# Patient Record
Sex: MALE | Race: WHITE | NOT HISPANIC OR LATINO | Employment: STUDENT | URBAN - METROPOLITAN AREA
[De-identification: names, ages, dates, MRNs, and addresses within clinical notes are randomized per-mention and may not be internally consistent; named-entity substitution may affect disease eponyms.]

---
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
## 2023-05-11 ENCOUNTER — APPOINTMENT (OUTPATIENT)
Dept: URGENT CARE | Age: 18
End: 2023-05-11

## 2024-01-24 ENCOUNTER — OFFICE VISIT (OUTPATIENT)
Dept: URGENT CARE | Facility: CLINIC | Age: 19
End: 2024-01-24
Payer: COMMERCIAL

## 2024-01-24 VITALS — TEMPERATURE: 98.4 F | OXYGEN SATURATION: 99 % | RESPIRATION RATE: 16 BRPM | HEART RATE: 100 BPM | WEIGHT: 150 LBS

## 2024-01-24 DIAGNOSIS — Z23 ENCOUNTER FOR ADMINISTRATION OF VACCINE: Primary | ICD-10-CM

## 2024-01-24 PROCEDURE — 90675 RABIES VACCINE IM: CPT

## 2024-01-24 PROCEDURE — G0382 LEV 3 HOSP TYPE B ED VISIT: HCPCS | Performed by: NURSE PRACTITIONER

## 2024-01-24 PROCEDURE — 90471 IMMUNIZATION ADMIN: CPT | Performed by: NURSE PRACTITIONER

## 2024-01-24 NOTE — PROGRESS NOTES
[facility]        NAME: Ronaldo Anton is a 18 y.o. male  : 2005    MRN: [id number]  DATE: 2024  TIME: 5:42 PM    Assessment and Plan   Encounter for administration of vaccine [Z23]  1. Encounter for administration of vaccine  Rabies vaccine IM (human diploid, IMOVAX)            Patient Instructions       Follow up with PCP in 3-5 days.  Proceed to  ER if symptoms worsen.    Chief Complaint     Chief Complaint   Patient presents with    Immunizations     Here for 3rd rabies vaccine           History of Present Illness       Patient is a 18 year old male presenting for 3rd rabies vaccine. He is a [employer] student who went on a volunteer trip from -. He reports there were bats in the room he was sleeping in. Denies any know contact. He had rabies Immuglobin and rabies vaccine 1 and 2 at [facility]. Denies concerns with prior vaccines.         Review of Systems   Review of Systems   Constitutional:  Negative for activity change, chills and fever.   Skin:  Negative for rash.   Neurological:  Negative for headaches.         Current Medications     No current outpatient medications on file.    Current Allergies     Allergies as of 2024    (Not on File)            The following portions of the patient's history were reviewed and updated as appropriate: allergies, current medications, past family history, past medical history, past social history, past surgical history and problem list.     No past medical history on file.    No past surgical history on file.    No family history on file.      Medications have been verified.        Objective   Pulse 100   Temp 98.4 °F (36.9 °C)   Resp 16   Wt 68 kg (150 lb)   SpO2 99%        Physical Exam     Physical Exam  Vitals reviewed.   Constitutional:       General: He is awake. He is not in acute distress.     Appearance: Normal appearance. He is normal weight.   Cardiovascular:      Rate and Rhythm: Normal rate.    Pulmonary:      Effort: Pulmonary effort is normal.   Skin:     General: Skin is warm and moist.   Neurological:      General: No focal deficit present.      Mental Status: He is alert and oriented to person, place, and time.   Psychiatric:         Behavior: Behavior is cooperative.

## 2024-01-24 NOTE — PATIENT INSTRUCTIONS
Rabies vaccine #3 given today.  Imovax Rabies   [employer]  Lot  [id number]  Exp. 10/2025    Please return on 1/31/2024 for your 4th and final rabies vaccine    Rabies   WHAT YOU NEED TO KNOW:   Rabies is a disease that affects the body's central nervous system (brain, spinal cord, and nerves). Rabies is caused by a virus. You may get the virus if you come into contact with the saliva or other tissue of an infected animal. Rabies infection usually happens through a bite wound. Animals that may spread rabies include dogs, cats, coyotes, raccoons, foxes, skunks, and bats. Rabies develops when the virus enters the skin and goes to the muscles or nerves.  DISCHARGE INSTRUCTIONS:   Call your local emergency number (911 in the [place]) or have someone else call if:   You have trouble swallowing or slurred speech.    You have double vision, or you see things that are not really there.    You begin twitching, have muscle cramps, or have a seizure.    Return to the emergency department if:   You think you were exposed to rabies.    You were bitten by an animal. Clean the wound as soon after the bite as possible.    You feel weak, tired, dizzy, confused, restless, or anxious.    Call your doctor if:   You have a fever.    Your signs and symptoms do not get better after treatment.    You have questions or concerns about rabies and rabies treatment.    Medicines:  You may need any of the following:  Medicines  such as the rabies vaccine or immune globulin may be given. These medicines help your body fight the virus and prevent rabies. Medicines may be given to help control seizures, treat a viral infection, or decrease inflammation.    Prescription pain medicine  may be given. Ask your healthcare provider how to take this medicine safely. Some prescription pain medicines contain acetaminophen. Do not take other medicines that contain acetaminophen without talking to your healthcare provider. Too much acetaminophen may cause liver  damage. Prescription pain medicine may cause constipation. Ask your healthcare provider how to prevent or treat constipation.     Take your medicine as directed.  Contact your healthcare provider if you think your medicine is not helping or if you have side effects. Tell your provider if you are allergic to any medicine. Keep a list of the medicines, vitamins, and herbs you take. Include the amounts, and when and why you take them. Bring the list or the pill bottles to follow-up visits. Carry your medicine list with you in case of an emergency.    If an animal that can carry rabies bites you:   Clean the bite wound.  Clean the bite wound for at least 5 minutes. Use soap and water, or povidone-iodine solution mixed with water. Do this right after you are bitten to lower the risks for a wound infection and rabies. Cover the wound with a clean bandage to prevent infection.    Seek care right away.  Healthcare providers may need to treat the wound and close it with stitches. You may need to take antibiotics to help fight or treat a bacterial infection. The rabies vaccine series may be started immediately.    Prevent rabies:   Ask your healthcare provider about the rabies vaccine.  You may need the vaccine if your risk for rabies is increased through work or travel. You will be given 2 doses a week apart. You may need a booster dose within 3 years after the first 2 doses.    Avoid contact with animals.  Do not approach any wild animal, or any tame animal that you do not know. Cover windows and other openings in your home with screens so wild animals cannot get inside.    Get medical care if you get bitten by an animal.  Do this even if the wound is very small.    Get your pet vaccinated against rabies.  You will need to do this every 3 years or as directed by your .       Follow up with your doctor as directed:  Write down your questions so you remember to ask them during your visits.  © Copyright [employer]  2023 Information is for End User's use only and may not be sold, redistributed or otherwise used for commercial purposes.  The above information is an  only. It is not intended as medical advice for individual conditions or treatments. Talk to your doctor, nurse or pharmacist before following any medical regimen to see if it is safe and effective for you.

## 2024-01-31 ENCOUNTER — OFFICE VISIT (OUTPATIENT)
Dept: URGENT CARE | Facility: CLINIC | Age: 19
End: 2024-01-31
Payer: COMMERCIAL

## 2024-01-31 VITALS — TEMPERATURE: 98.3 F

## 2024-01-31 DIAGNOSIS — Z23 NEED FOR RABIES VACCINATION: Primary | ICD-10-CM

## 2024-01-31 PROCEDURE — 90471 IMMUNIZATION ADMIN: CPT

## 2024-01-31 PROCEDURE — 90675 RABIES VACCINE IM: CPT

## 2024-01-31 RX ORDER — DAPSONE 75 MG/G
GEL TOPICAL
COMMUNITY
Start: 2023-11-20

## 2024-05-31 ENCOUNTER — OFFICE VISIT (OUTPATIENT)
Dept: URGENT CARE | Facility: CLINIC | Age: 19
End: 2024-05-31
Payer: COMMERCIAL

## 2024-05-31 VITALS
HEART RATE: 112 BPM | WEIGHT: 150 LBS | TEMPERATURE: 98.7 F | OXYGEN SATURATION: 98 % | BODY MASS INDEX: 21.47 KG/M2 | DIASTOLIC BLOOD PRESSURE: 84 MMHG | RESPIRATION RATE: 14 BRPM | SYSTOLIC BLOOD PRESSURE: 137 MMHG | HEIGHT: 70 IN

## 2024-05-31 DIAGNOSIS — J02.9 SORE THROAT: Primary | ICD-10-CM

## 2024-05-31 DIAGNOSIS — J06.9 VIRAL UPPER RESPIRATORY TRACT INFECTION: ICD-10-CM

## 2024-05-31 LAB — S PYO AG THROAT QL: NEGATIVE

## 2024-05-31 PROCEDURE — 87880 STREP A ASSAY W/OPTIC: CPT | Performed by: NURSE PRACTITIONER

## 2024-05-31 PROCEDURE — 87147 CULTURE TYPE IMMUNOLOGIC: CPT | Performed by: NURSE PRACTITIONER

## 2024-05-31 PROCEDURE — 87070 CULTURE OTHR SPECIMN AEROBIC: CPT | Performed by: NURSE PRACTITIONER

## 2024-05-31 PROCEDURE — G0382 LEV 3 HOSP TYPE B ED VISIT: HCPCS | Performed by: NURSE PRACTITIONER

## 2024-05-31 NOTE — PROGRESS NOTES
[facility]        NAME: Ronaldo Anton is a 18 y.o. male  : 2005    MRN: [id number]  DATE: May 31, 2024  TIME: 7:04 PM    Assessment and Plan   Sore throat [J02.9]  1. Sore throat  POCT rapid strepA    Throat culture      2. Viral upper respiratory tract infection              Patient Instructions     Patient Instructions   --Rest, drink plenty of fluids  --Rapid strep test negative. Will send full throat culture, calling if results are positive (anticipate 48-72 hours).   --Consider Mono blood test if throat culture negative and ongoing sore throat over the next week, particularly if accompanied by fatigue  --Consider vitamin C, zinc, quercetin, and vitamin D to help strengthen your immune system  --For cough, you can take an OTC expectorant such as plain Robitussion or Mucinex (active ingredient guaifenesin).  A spoonful of honey at bedtime may also be helpful, as may a prescription cough medicine.  Also recommended is the use of a cool mist humidifier (with or without Vicks) in the bedroom at night.   --For sore throat, you can take OTC lozenges, use warm gargles (salt water or apple cider vinegar and honey), herbal teas, or an OTC throat spray (Chloraseptic).  --For nasal/sinus congestion, helpful measures include steam, warm compresses, an OTC saline nasal spray or Neti pot, or an OTC decongestant (such as Sudafed).  The decongestant should be avoided, however, if you are under 6 years of age, or have a history of high blood pressure or heart disease.  In addition, an OTC nasal steroid (Flonase, Nasocort) or nasal decongestant (Afrin, Rashawn-synephrine) may be taken.  The nasal steroid should be used at bedtime, after the saline nasal spray. The nasal decongestant should not be taken more than 3 days consecutively in order to prevent rebound congestion.   --For nasal drainage, postnasal drip, sneezing and itching, an OTC antihistamine (Allegra, Benadryl, etc) can be taken.   --You can take  Tylenol or Motrin/Advil as needed for fever, headache, body aches. Motrin/Advil should be avoided, however, if you have a history of heart disease, bleeding ulcers, or if you take blood thinners.   --You should contact your primary care provider and/or go to the ER if your symptoms are not improved or get worse over the next 7 days.  This includes new onset fever, localized ear pain, sinus pain, as well as worsening cough, chest pain, shortness of breath, or significant weakness/fatigue.            If tests have been performed at [facility], our office will contact you with results if changes need to be made to the care plan discussed with you at the visit.  You can review your full results on [facility].    Chief Complaint     Chief Complaint   Patient presents with    Sore Throat     Sore throat x 2 weeks.          History of Present Illness       Here with complaints of sore throat, nasal congestion, rhinorrhea, PND, cough, headache x 2 weeks.   No fever, fatigue.   No GI complaints.   No rash or swollen glands noted.   No spring allergies.    Taking Advil.        Review of Systems   Review of Systems   Constitutional:  Negative for fatigue and fever.   HENT:  Positive for rhinorrhea and sore throat.    Respiratory:  Positive for cough.    Gastrointestinal:  Negative for abdominal pain and vomiting.   Musculoskeletal:  Negative for myalgias.   Neurological:  Positive for headaches.         Current Medications       Current Outpatient Medications:     Dapsone 7.5 % GEL, apply TO FACE EVERY MORNING daily, Disp: , Rfl:     Current Allergies     Allergies as of 05/31/2024    (No Known Allergies)            The following portions of the patient's history were reviewed and updated as appropriate: allergies, current medications, past family history, past medical history, past social history, past surgical history and problem list.     History reviewed. No pertinent past medical history.    History reviewed. No  "pertinent surgical history.    History reviewed. No pertinent family history.      Medications have been verified.        Objective   /84   Pulse (!) 112   Temp 98.7 °F (37.1 °C)   Resp 14   Ht 5' 10\" (1.778 m)   Wt 68 kg (150 lb)   SpO2 98%   BMI 21.52 kg/m²   No LMP for male patient.       Physical Exam     Physical Exam  Constitutional:       General: He is not in acute distress.     Appearance: He is well-developed. He is not diaphoretic.   HENT:      Head: Normocephalic and atraumatic.      Right Ear: Tympanic membrane, ear canal and external ear normal.      Left Ear: Tympanic membrane, ear canal and external ear normal.      Nose: Congestion present. No rhinorrhea.      Comments: No sinus tenderness.      Mouth/Throat:      Pharynx: Posterior oropharyngeal erythema present. No oropharyngeal exudate.      Comments: Tonsils 1+ erythematous, without exudate.   Cardiovascular:      Rate and Rhythm: Normal rate and regular rhythm.      Heart sounds: Normal heart sounds.   Pulmonary:      Effort: Pulmonary effort is normal. No respiratory distress.      Breath sounds: Normal breath sounds. No stridor. No wheezing, rhonchi or rales.   Chest:      Chest wall: No tenderness.   Abdominal:      Tenderness: There is no abdominal tenderness.   Musculoskeletal:      Cervical back: Neck supple. No rigidity or tenderness.   Lymphadenopathy:      Cervical: No cervical adenopathy.   Skin:     General: Skin is warm and dry.      Findings: No rash.   Neurological:      Mental Status: He is alert and oriented to person, place, and time.   Psychiatric:         Mood and Affect: Mood normal.                   "

## 2024-05-31 NOTE — PATIENT INSTRUCTIONS
--Rest, drink plenty of fluids  --Rapid strep test negative. Will send full throat culture, calling if results are positive (anticipate 48-72 hours).   --Consider Mono blood test if throat culture negative and ongoing sore throat over the next week, particularly if accompanied by fatigue  --Consider vitamin C, zinc, quercetin, and vitamin D to help strengthen your immune system  --For cough, you can take an OTC expectorant such as plain Robitussion or Mucinex (active ingredient guaifenesin).  A spoonful of honey at bedtime may also be helpful, as may a prescription cough medicine.  Also recommended is the use of a cool mist humidifier (with or without Vicks) in the bedroom at night.   --For sore throat, you can take OTC lozenges, use warm gargles (salt water or apple cider vinegar and honey), herbal teas, or an OTC throat spray (Chloraseptic).  --For nasal/sinus congestion, helpful measures include steam, warm compresses, an OTC saline nasal spray or Neti pot, or an OTC decongestant (such as Sudafed).  The decongestant should be avoided, however, if you are under 6 years of age, or have a history of high blood pressure or heart disease.  In addition, an OTC nasal steroid (Flonase, Nasocort) or nasal decongestant (Afrin, Rashawn-synephrine) may be taken.  The nasal steroid should be used at bedtime, after the saline nasal spray. The nasal decongestant should not be taken more than 3 days consecutively in order to prevent rebound congestion.   --For nasal drainage, postnasal drip, sneezing and itching, an OTC antihistamine (Allegra, Benadryl, etc) can be taken.   --You can take Tylenol or Motrin/Advil as needed for fever, headache, body aches. Motrin/Advil should be avoided, however, if you have a history of heart disease, bleeding ulcers, or if you take blood thinners.   --You should contact your primary care provider and/or go to the ER if your symptoms are not improved or get worse over the next 7 days.  This includes  new onset fever, localized ear pain, sinus pain, as well as worsening cough, chest pain, shortness of breath, or significant weakness/fatigue.

## 2024-06-02 LAB — BACTERIA THROAT CULT: NORMAL

## 2024-06-03 DIAGNOSIS — J02.9 ACUTE PHARYNGITIS, UNSPECIFIED ETIOLOGY: Primary | ICD-10-CM

## 2024-06-03 LAB — BACTERIA THROAT CULT: ABNORMAL

## 2024-06-03 RX ORDER — AMOXICILLIN 500 MG/1
500 CAPSULE ORAL EVERY 12 HOURS SCHEDULED
Qty: 20 CAPSULE | Refills: 0 | Status: SHIPPED | OUTPATIENT
Start: 2024-06-03 | End: 2024-06-13

## 2025-05-08 ENCOUNTER — APPOINTMENT (OUTPATIENT)
Age: 20
End: 2025-05-08